# Patient Record
Sex: FEMALE
[De-identification: names, ages, dates, MRNs, and addresses within clinical notes are randomized per-mention and may not be internally consistent; named-entity substitution may affect disease eponyms.]

---

## 2017-07-05 PROBLEM — Z00.00 ENCOUNTER FOR PREVENTIVE HEALTH EXAMINATION: Status: ACTIVE | Noted: 2017-07-05

## 2017-07-24 ENCOUNTER — APPOINTMENT (OUTPATIENT)
Dept: ENDOCRINOLOGY | Facility: CLINIC | Age: 77
End: 2017-07-24

## 2017-07-24 VITALS
HEART RATE: 74 BPM | SYSTOLIC BLOOD PRESSURE: 145 MMHG | DIASTOLIC BLOOD PRESSURE: 70 MMHG | HEIGHT: 61.02 IN | WEIGHT: 130 LBS | BODY MASS INDEX: 24.55 KG/M2

## 2017-07-24 DIAGNOSIS — Z86.39 PERSONAL HISTORY OF OTHER ENDOCRINE, NUTRITIONAL AND METABOLIC DISEASE: ICD-10-CM

## 2017-07-24 DIAGNOSIS — Z83.3 FAMILY HISTORY OF DIABETES MELLITUS: ICD-10-CM

## 2017-07-24 DIAGNOSIS — Z87.891 PERSONAL HISTORY OF NICOTINE DEPENDENCE: ICD-10-CM

## 2017-07-24 RX ORDER — ATENOLOL 50 MG/1
50 TABLET ORAL
Refills: 0 | Status: ACTIVE | COMMUNITY

## 2017-07-24 RX ORDER — NIFEDIPINE 30 MG/1
30 TABLET, FILM COATED, EXTENDED RELEASE ORAL
Refills: 0 | Status: ACTIVE | COMMUNITY

## 2017-07-31 ENCOUNTER — APPOINTMENT (OUTPATIENT)
Dept: ENDOCRINOLOGY | Facility: CLINIC | Age: 77
End: 2017-07-31
Payer: MEDICAID

## 2017-07-31 PROCEDURE — 10022: CPT

## 2017-07-31 PROCEDURE — 76536 US EXAM OF HEAD AND NECK: CPT

## 2017-07-31 PROCEDURE — 99213 OFFICE O/P EST LOW 20 MIN: CPT | Mod: 25

## 2017-08-01 VITALS
HEIGHT: 61 IN | BODY MASS INDEX: 24.55 KG/M2 | SYSTOLIC BLOOD PRESSURE: 144 MMHG | DIASTOLIC BLOOD PRESSURE: 72 MMHG | WEIGHT: 130 LBS | HEART RATE: 78 BPM

## 2017-11-01 ENCOUNTER — APPOINTMENT (OUTPATIENT)
Dept: ENDOCRINOLOGY | Facility: CLINIC | Age: 77
End: 2017-11-01
Payer: MEDICAID

## 2017-11-01 VITALS
DIASTOLIC BLOOD PRESSURE: 75 MMHG | SYSTOLIC BLOOD PRESSURE: 138 MMHG | HEIGHT: 61 IN | BODY MASS INDEX: 24.55 KG/M2 | HEART RATE: 80 BPM | WEIGHT: 130 LBS

## 2017-11-01 PROCEDURE — 99213 OFFICE O/P EST LOW 20 MIN: CPT

## 2017-11-01 RX ORDER — METFORMIN HYDROCHLORIDE 1000 MG/1
1000 TABLET, COATED ORAL TWICE DAILY
Qty: 60 | Refills: 2 | Status: COMPLETED | COMMUNITY
End: 2017-11-01

## 2018-10-25 ENCOUNTER — APPOINTMENT (OUTPATIENT)
Dept: ENDOCRINOLOGY | Facility: CLINIC | Age: 78
End: 2018-10-25
Payer: MEDICAID

## 2018-10-25 VITALS
WEIGHT: 125 LBS | HEIGHT: 61 IN | DIASTOLIC BLOOD PRESSURE: 89 MMHG | SYSTOLIC BLOOD PRESSURE: 180 MMHG | BODY MASS INDEX: 23.6 KG/M2 | HEART RATE: 75 BPM

## 2018-10-25 PROCEDURE — 99214 OFFICE O/P EST MOD 30 MIN: CPT

## 2018-10-26 RX ORDER — GLIPIZIDE 10 MG/1
10 TABLET, EXTENDED RELEASE ORAL
Refills: 0 | Status: COMPLETED | COMMUNITY
End: 2018-10-26

## 2019-10-29 ENCOUNTER — APPOINTMENT (OUTPATIENT)
Dept: ENDOCRINOLOGY | Facility: CLINIC | Age: 79
End: 2019-10-29

## 2020-10-29 ENCOUNTER — APPOINTMENT (OUTPATIENT)
Dept: ENDOCRINOLOGY | Facility: CLINIC | Age: 80
End: 2020-10-29
Payer: MEDICAID

## 2020-10-29 VITALS
WEIGHT: 120 LBS | SYSTOLIC BLOOD PRESSURE: 137 MMHG | HEART RATE: 84 BPM | DIASTOLIC BLOOD PRESSURE: 69 MMHG | HEIGHT: 61 IN | BODY MASS INDEX: 22.66 KG/M2

## 2020-10-29 DIAGNOSIS — I10 ESSENTIAL (PRIMARY) HYPERTENSION: ICD-10-CM

## 2020-10-29 DIAGNOSIS — E11.9 TYPE 2 DIABETES MELLITUS W/OUT COMPLICATIONS: ICD-10-CM

## 2020-10-29 DIAGNOSIS — E04.2 NONTOXIC MULTINODULAR GOITER: ICD-10-CM

## 2020-10-29 LAB
GLUCOSE BLDC GLUCOMTR-MCNC: 266
HBA1C MFR BLD HPLC: 7.5

## 2020-10-29 PROCEDURE — 99072 ADDL SUPL MATRL&STAF TM PHE: CPT

## 2020-10-29 PROCEDURE — 99215 OFFICE O/P EST HI 40 MIN: CPT | Mod: 25

## 2020-10-29 PROCEDURE — 82962 GLUCOSE BLOOD TEST: CPT

## 2020-10-29 PROCEDURE — 83036 HEMOGLOBIN GLYCOSYLATED A1C: CPT | Mod: QW

## 2020-10-29 RX ORDER — IRBESARTAN 300 MG/1
300 TABLET ORAL
Refills: 0 | Status: COMPLETED | COMMUNITY
End: 2020-10-29

## 2020-10-29 RX ORDER — LOSARTAN POTASSIUM 100 MG/1
100 TABLET, FILM COATED ORAL DAILY
Qty: 90 | Refills: 3 | Status: ACTIVE | COMMUNITY
Start: 2020-10-29

## 2020-10-29 RX ORDER — EMPAGLIFLOZIN 25 MG/1
25 TABLET, FILM COATED ORAL
Refills: 0 | Status: COMPLETED | COMMUNITY
End: 2020-10-29

## 2020-10-29 RX ORDER — PIOGLITAZONE HYDROCHLORIDE 15 MG/1
15 TABLET ORAL
Qty: 90 | Refills: 3 | Status: ACTIVE | COMMUNITY
Start: 2020-10-29 | End: 1900-01-01

## 2020-10-29 RX ORDER — METFORMIN HYDROCHLORIDE 1000 MG/1
1000 TABLET, COATED ORAL TWICE DAILY
Qty: 180 | Refills: 3 | Status: ACTIVE | COMMUNITY
Start: 1900-01-01 | End: 1900-01-01

## 2020-10-29 RX ORDER — LEVOTHYROXINE SODIUM 0.05 MG/1
50 TABLET ORAL DAILY
Qty: 90 | Refills: 3 | Status: ACTIVE | COMMUNITY
Start: 1900-01-01 | End: 1900-01-01

## 2020-10-30 NOTE — ASSESSMENT
[FreeTextEntry1] : 76 y/o F referred for evaluation of multiple endocrine issues:\par \par 1) Non toxic multinodular goiter: Appears clinically euthyroid. POC US exhibits one R thyroid nodule which underwent FNA biopsy on 7/2017, results were indeterminate ([place] I) but pt declines repeat sampling at this time.  US from 6/2017 from the DR shows past benign biopsy results + stable nodule size. Continue LT4 at this time as this dose seems age/weight based appropiate. Reassess TFTs in the future. Surveillance US remained stable as of 2018, plan is to repeat US examination at this time.\par \par 2) DM2: A1C for her age would be ~8%. No complications reported. Again ADA diet was reviewed at length. Will reassess on NV w/ labs, microalbumin and statin need, labslip provided. Podiatry and Ophtho referrals on the NV. Now switched from [facility] to Jardiance, denies UTIs or polyuria. given ongoing weight loss, we will switch from jardiance to pioglitazone 15 mg PO QD. Pt reports no past history of CHF.\par \par 3) HTN: Better controlled, recently started on SGLT-2 inh, advised on compliance w/ irbesartan and need for titration if home BP reaadings > 130/90. Verbalized understanding and agrees with treatment plan, will contact MD and seek emergency medical care if condition changes.\par  [Carbohydrate Consistent Diet] : carbohydrate consistent diet [Long Term Vascular Complications] : long term vascular complications of diabetes [Importance of Diet and Exercise] : importance of diet and exercise to improve glycemic control, achieve weight loss and improve cardiovascular health

## 2020-10-30 NOTE — HISTORY OF PRESENT ILLNESS
[FreeTextEntry1] : PCP: Jovan GRISSOM [facility] \par \par 81 y/o F w/ Hx of NIDDM2, HTN, hypothyroidism and non toxic multinodular goiter. Referred for evaluation of multiple endocrine issues. Here for f/u US/biopsy which was performed on 7/2017, results were undeterminate ([place] I). Pt chose to have repeat US in 3 months and she is here for surveillance US. Pt did not bring past US from the DR as instructed. Otherwise today she denies any current complaints\par \par She reports being diagnosed w/ thyroid nodules in the DR. 2 years ago she required an FNA biopsy of one of her nodules, which was performed in the DR and she reports as benign. Since, she notes she felt a lump in her anterior neck, not associated w/ pain, dysphagia or difficulty breathing. This prompted her to see her PCP, which referred her for an US of the neck, performed in 6/2017 (report not available). She denies any personal or family history of thyroid cancer/radiation. She feels well, repots no f/c, CP, SOB, palpitation, n/v, heat/cold intolerance, stool or urinary abnormalities.\par \par In terms of her diabetes, she was siagnosed ~10 years ago; reports no polyuria, polydipsia or weight changes. She reports compliance w/ pills (see med rec). She does not monitor at home. She denies diabetes related complications. No reports dietary indiscretions.\par \par She reports a diagnosis of Hashimoto thyroiditis, requiring 50 mcg of LT4 which she takes in AM on an empty stomach w/ water only. \par \par 10/2020: Here to re establish care, lost to f/u x 2 years. No new complaints or interval events since the LV. She brought in records from biopsy in the DR, which reveal [place] II results + stable 3.6 R lower lobe nodule. She otherwise denies any f/c, CP, SOB, palpitations, tremors, depressed mood, anxiety, palpitations, n/v, stool/urinary abn, skin/weight changes, heat/cold intolerance, HAs, breast/nipple changes, polyuria/polydipsia/nocturia or other complaints.\par she again denies any dysphagia, hoarseness, neck tenderness or new palpable masses. she again denies any family history of thyroid disorders or personal exposure to ionizing radiation.\par

## 2020-10-30 NOTE — CONSULT LETTER
[Dear  ___] : Dear  [unfilled], [Consult Letter:] : I had the pleasure of evaluating your patient, [unfilled]. [Please see my note below.] : Please see my note below. [Consult Closing:] : Thank you very much for allowing me to participate in the care of this patient.  If you have any questions, please do not hesitate to contact me. [Sincerely,] : Sincerely, [FreeTextEntry3] : Shawn Davis MD\par Cell: [phone]

## 2021-10-06 PROBLEM — I10 ESSENTIAL HYPERTENSION: Status: ACTIVE | Noted: 2017-07-24
